# Patient Record
Sex: FEMALE | Race: WHITE | NOT HISPANIC OR LATINO | URBAN - METROPOLITAN AREA
[De-identification: names, ages, dates, MRNs, and addresses within clinical notes are randomized per-mention and may not be internally consistent; named-entity substitution may affect disease eponyms.]

---

## 2017-02-28 ENCOUNTER — OFFICE VISIT - RIVER FALLS (OUTPATIENT)
Dept: FAMILY MEDICINE | Facility: CLINIC | Age: 37
End: 2017-02-28

## 2017-04-28 ENCOUNTER — COMMUNICATION - RIVER FALLS (OUTPATIENT)
Dept: FAMILY MEDICINE | Facility: CLINIC | Age: 37
End: 2017-04-28

## 2017-04-28 ENCOUNTER — OFFICE VISIT - RIVER FALLS (OUTPATIENT)
Dept: FAMILY MEDICINE | Facility: CLINIC | Age: 37
End: 2017-04-28

## 2017-04-28 ASSESSMENT — MIFFLIN-ST. JEOR: SCORE: 1551.75

## 2017-05-05 ENCOUNTER — RECORDS - HEALTHEAST (OUTPATIENT)
Dept: ADMINISTRATIVE | Facility: OTHER | Age: 37
End: 2017-05-05

## 2017-05-05 LAB
LAB AP CHARGES (HE HISTORICAL CONVERSION): NORMAL
PATH REPORT.COMMENTS IMP SPEC: NORMAL
PATH REPORT.COMMENTS IMP SPEC: NORMAL
PATH REPORT.FINAL DX SPEC: NORMAL
PATH REPORT.GROSS SPEC: NORMAL
PATH REPORT.MICROSCOPIC SPEC OTHER STN: NORMAL
PATH REPORT.RELEVANT HX SPEC: NORMAL
RESULT FLAG (HE HISTORICAL CONVERSION): NORMAL

## 2022-02-11 VITALS
WEIGHT: 181.4 LBS | SYSTOLIC BLOOD PRESSURE: 112 MMHG | DIASTOLIC BLOOD PRESSURE: 73 MMHG | HEART RATE: 71 BPM | TEMPERATURE: 98 F

## 2022-02-11 VITALS
DIASTOLIC BLOOD PRESSURE: 68 MMHG | WEIGHT: 184.8 LBS | BODY MASS INDEX: 28.01 KG/M2 | HEART RATE: 70 BPM | TEMPERATURE: 98.2 F | HEIGHT: 68 IN | SYSTOLIC BLOOD PRESSURE: 116 MMHG

## 2022-02-16 NOTE — PROGRESS NOTES
Patient:   MANN NICHOLSON            MRN: [id number]            FIN: [id number]               Age:   36 years     Sex:  Female     :  1980   Associated Diagnoses:   Migratory thrombophlebitis   Author:   Luciano Pimentel MD      Visit Information      Date of Service: 2017 01:48 pm  Performing Location: [facility]  Encounter#: [id number]      Primary Care Provider (PCP):  Not recorded.      Referring Provider:  No referring provider recorded for selected visit.      Chief Complaint   2017 1:59 PM CST    Left leg pain, swelling.  Hx of DVT in the past.        History of Present Illness   chief complaint and symptoms as noted above confirmed with patient   Fell on knee in [facility] last week  now some tenderness medial left thigh  No cp or sob  No swelling      Review of Systems   Constitutional:  Negative except as documented in history of present illness.    Respiratory:  Negative.    Cardiovascular:  Negative except as documented in history of present illness.    Gastrointestinal:  Negative.    Genitourinary:  Negative.    Musculoskeletal:  Negative except as documented in history of present illness.    Integumentary:  Negative.    Neurologic:  Negative.             Health Status   Allergies:    Allergic Reactions (Selected)  Severity Not Documented  Cefzil (No reactions were documented)  Septra (No reactions were documented)   Problem list:    All Problems (Selected)  Dyslipidemia / ICD-9-.4 / Confirmed  History of DVT (deep vein thrombosis) / ICD-9-CM V12.51 / Confirmed      Histories   Past Medical History:    Active  Dyslipidemia (272.4)  History of DVT (deep vein thrombosis) (V12.51)  Resolved  Orbital floor fracture (802.6): Onset in the month of 2013 at 32 years  Resolved.  Tibial Plateau Fracture (823.00): Onset in the month of 2013 at 32 years  Resolved.  Anticoagulated (V58.61):  Resolved.   Family History:    Dyslipidemia  Mother  Grandmother  (M)  Cancer  Mother  HTN - Hypertension  Brother     Procedure history:    Arthroscopy of knee (SNOMED CT 795792213) in 1995 at 15 Years.   Social History:        Tobacco Assessment            Never        Physical Examination   Vital Signs   2/28/2017 1:59 PM CST Temperature Tympanic 98.0 DegF    Peripheral Pulse Rate 71 bpm    Systolic Blood Pressure 112 mmHg    Diastolic Blood Pressure 73 mmHg    Mean Arterial Pressure 86 mmHg      Measurements from flowsheet : Measurements   2/28/2017 1:59 PM CST    Weight Measured - Standard                181.4 lb     General:  Alert and oriented, No acute distress.    Cardiovascular:  tender superficial vein medial left thigh from mid to distal  neg kearney No swelling or reddness.    Musculoskeletal:       Lower extremity exam: Knee ( Left, Ecchymosis, No effusion, Swelling, Tenderness, Normal range of motion ).    Neurologic:  Alert, Oriented.       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     Course:  Not progressing as expected.    Plan:  asa heat  US  fu 1 week if not better sooner if worse.    Patient Instructions:       Counseled: Patient, Regarding diagnosis, Regarding treatment, Regarding medications, Activity.

## 2022-02-16 NOTE — PROGRESS NOTES
Patient:   MANN NICHOLSON            MRN: [id number]            FIN: [id number]               Age:   36 years     Sex:  Female     :  1980   Associated Diagnoses:   Pre-op exam; Heavy periods   Author:   Di Lee      Preoperative Information   Here for preop history and physical      Chief Complaint   2017 4:11 PM CDT    Preop DOS 17 Dr. Cartwright hysteroscopy @ [facility] [phone]   She has had heavy periods and ultrasound showed mass that needs further eval  Hx of DVT in , hx of superficial bruising in 2016      Review of Systems   Constitutional:  Negative.    Weight has been stable, no nutritional concerns  No Allergies to latex, iodine, contrast dye, shell fish or local anesthetics   Eye:  Negative.    Ear/Nose/Mouth/Throat:  Negative.    Respiratory:  Negative.    No history of sleep apnea   Cardiovascular:  Negative.    Hx of DVT 4 years ago, not on an anticoagulant   Gastrointestinal:  Negative.    Genitourinary:  Negative.    Pregnancy ruled out-LMP 3 days ago, contracepting with condoms   Hematology/Lymphatics:  as above.    Endocrine:  Negative.    Immunologic:  Negative.    Musculoskeletal:  Negative.    Integumentary:  Negative.    Neurologic:  Negative.    Psychiatric:  Negative.    All other systems reviewed and negative      Health Status   Allergies:    Allergic Reactions (Selected)  Severity Not Documented  Cefzil (No reactions were documented)  Septra (No reactions were documented)   Problem list:    All Problems  Dyslipidemia / ICD-9-.4 / Confirmed  History of DVT (deep vein thrombosis) / ICD-9-CM V12.51 / Confirmed  Resolved: Orbital floor fracture / ICD-9-.6  Resolved: Tibial Plateau Fracture / ICD-9-.00  Resolved: Anticoagulated / ICD-9-CM V58.61  Canceled: DVT (Deep Venous Thrombosis) / ICD-9-.40   Medications:  (Selected)   Documented Medications  Documented  atorvastatin 20 mg oral tablet: 1 tab(s) ( 20 mg ), po,  daily, 0 Refill(s), Type: Maintenance      Histories   Past Medical History:    Active  Dyslipidemia (272.4)  History of DVT (deep vein thrombosis) (V12.51)  Resolved  Orbital floor fracture (802.6): Onset in the month of 5/2013 at 32 years  Resolved.  Tibial Plateau Fracture (823.00): Onset in the month of 5/2013 at 32 years  Resolved.  Anticoagulated (V58.61):  Resolved.   Family History:    Dyslipidemia  Mother  Grandmother (M)  Cancer  Mother  HTN - Hypertension  Brother     Procedure history:    Arthroscopy of knee (SNOMED CT 591452852) in 1995 at 15 Years.   Social History:        Tobacco Assessment            Never  ,        Tobacco Assessment            Never        Physical Examination   Vital Signs   4/28/2017 4:11 PM CDT Temperature Tympanic 98.2 DegF    Peripheral Pulse Rate 70 bpm    Pulse Site Radial artery    HR Method Manual    Systolic Blood Pressure 116 mmHg    Diastolic Blood Pressure 68 mmHg    Mean Arterial Pressure 84 mmHg    BP Site Right arm    BP Method Electronic      Measurements from flowsheet : Measurements   4/28/2017 4:11 PM CDT Height Measured - Standard 68 in    Weight Measured - Standard 184.8 lb    BSA 2 m2    Body Mass Index 28.1 kg/m2      General:  Alert and oriented, No acute distress.    Eye:  Normal conjunctiva.    HENT:  Normocephalic, Tympanic membranes are clear, Oral mucosa is moist, No pharyngeal erythema, Mallampati Score 2.    Neck:  Supple, Non-tender, No carotid bruit, No jugular venous distention, No lymphadenopathy, No thyromegaly.    Respiratory:  Breath sounds are equal, Symmetrical chest wall expansion.         Respirations: Are within normal limits.         Pattern: Regular.         Breath sounds: Bilateral, Within normal limits.    Cardiovascular:  Normal rate, Regular rhythm, No murmur, Good pulses equal in all extremities, No edema.    Gastrointestinal:  Soft, Non-tender, Non-distended.    Musculoskeletal:  Normal range of motion, Normal strength, Normal  gait.    Integumentary:  Warm, Dry, Intact, No rash.    Neurologic:  Alert, Oriented, Normal motor function, No focal deficits.    Psychiatric:  Cooperative, Appropriate mood & affect.       Review / Management   No family history of bleeding tendencies, thrombophilias, or anesthesia complications.  No personal history of thrombophilias, anesthesia complications, or valvular disease.       Impression and Plan   Diagnosis     Pre-op exam (QHI49-XJ Z01.818).     Heavy periods (OET08-PS N92.0).     Condition:  Stable, Procede with procedure/surgery..    Orders     No SBE prophylaxis or DVT prophylaxis necessary.     Informed patient to avoid aspirin and ibuprofen type products 10 days prior to surgery    CBC pending.

## 2023-05-14 ENCOUNTER — HEALTH MAINTENANCE LETTER (OUTPATIENT)
Age: 43
End: 2023-05-14

## 2023-12-30 ENCOUNTER — HEALTH MAINTENANCE LETTER (OUTPATIENT)
Age: 43
End: 2023-12-30

## 2024-03-03 ENCOUNTER — HEALTH MAINTENANCE LETTER (OUTPATIENT)
Age: 44
End: 2024-03-03